# Patient Record
Sex: MALE | Employment: FULL TIME | URBAN - METROPOLITAN AREA
[De-identification: names, ages, dates, MRNs, and addresses within clinical notes are randomized per-mention and may not be internally consistent; named-entity substitution may affect disease eponyms.]

---

## 2016-08-07 LAB
CREATININE, EXTERNAL: 1.91
HBA1C MFR BLD HPLC: 6.3 %
LDL-C, EXTERNAL: 74
PSA, EXTERNAL: 0.5

## 2017-08-29 ENCOUNTER — OFFICE VISIT (OUTPATIENT)
Dept: HEMATOLOGY | Age: 57
End: 2017-08-29

## 2017-08-29 VITALS
WEIGHT: 179.8 LBS | DIASTOLIC BLOOD PRESSURE: 72 MMHG | OXYGEN SATURATION: 98 % | SYSTOLIC BLOOD PRESSURE: 156 MMHG | HEART RATE: 87 BPM | HEIGHT: 66 IN | TEMPERATURE: 98.8 F | BODY MASS INDEX: 28.9 KG/M2

## 2017-08-29 DIAGNOSIS — Z79.899 LONG TERM CURRENT USE OF IMMUNOSUPPRESSIVE DRUG: ICD-10-CM

## 2017-08-29 DIAGNOSIS — Z94.4 LIVER REPLACED BY TRANSPLANT (HCC): ICD-10-CM

## 2017-08-29 DIAGNOSIS — Z94.4 H/O LIVER TRANSPLANT (HCC): ICD-10-CM

## 2017-08-29 DIAGNOSIS — N28.9 RENAL INSUFFICIENCY: ICD-10-CM

## 2017-08-29 DIAGNOSIS — T86.40 COMPLICATION OF TRANSPLANTED LIVER, UNSPECIFIED COMPLICATION (HCC): Primary | ICD-10-CM

## 2017-10-08 NOTE — PROGRESS NOTES
[address] MD Guzman, [address], [place], [place]       Faustina Cuellar, JEFERSON Whelan, [facility]   EVETTE Li NP        at [address], [address], [facility].     [phone]     FAX: [phone]    at [facility]     [address], [address] [place] [address], [address]     [phone]     FAX: [phone]         PCP: Neto Hauser [place], [place]  GI: Monica García [place], [place]  Liver Transplant: [address], [facility]      Problem List  Date Reviewed: 10/16/2016          Codes Class Noted    Encounter for long-term (current) use of other medications ICD-10-CM: Z79.899  ICD-9-CM: V58.69  4/41/5356        Complications of transplanted liver ICD-10-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 No DVT. Clot source never defined. H/O liver transplant [facility]) ICD-10-CM: Z94.4  ICD-9-CM: V42.7  Unknown    Overview Addendum 8/18/2011  3:05 PM by Dannielle Goodman MD     [facility]  Bile duct leak surgically repaired 11/2010  Bile duct stent removed by ERCP 2/2011.                Guillain-McCarr syndrome - treated with IVg ICD-10-CM: G61.0  ICD-9-CM: 357.0  Unknown        Tremor - secondary to prograff ICD-10-CM: R25.1  ICD-9-CM: 781.0  Unknown    Overview Signed 11/28/2011  1:39 PM by Dannielle Goodman MD     Resolved when switched to Cyclosporin             Headache - secondary to immune suppression ICD-10-CM: R51  ICD-9-CM: 784.0  Unknown        Renal insufficiency - secondary to immune suppression ICD-10-CM: N28.9  ICD-9-CM: 593.9  Unknown        Edema ICD-10-CM: R60.9  ICD-9-CM: 879. 3  Unknown        Hypertension - secondary to immune suppressive medications ICD-10-CM: I10  ICD-9-CM: 401.9  8/18/2011                Lico Billings MD returns to the [facility] for his annual visit, management of liver allograft function and to manage side effects of immune suppression. The active problem list, all pertinent past medical history, medications, endoscopic studies, radiologic findings and laboratory findings related to the liver disorder were reviewed with the patient. We continue to monitor his labs and immune suppression between office visits. The patient underwent liver transplantation at [facility] in 11/2010. The patient was switched to sirolimus based immune suppression because of chronic renal insufficiency. He is also receiving cellcept. There has been no episodes of acute rejection. There has been no evidence of chronic rejection. The liver transaminases have been normal, alkaline phosphatase is normal, tests of hepatic synthetic and metabolic function are normal, and the platelet count is normal.    He takes lasix daily for lower extremity edema. The patient notes fatigue,     The fatigue limits his ability to work full time. He is able to work about 3 half days per week. Anything more is exhuasting and causes increased lower edema because he is on his feet too long. The patient has not experienced fevers, chills, problems concentrating, swelling of the abdomen, swelling of the lower extremities, hematemesis, hematochezia. The patient has moderate limitations in functional activities which can be attributed to the liver disease and to other medical problems that are not related to the liver disease. He can work part time but not full time because of the above symptoms. There ha ve been no new medical issues or symptoms in the past year.         Allergies   Allergen Reactions    Sulfa (Sulfonamide Antibiotics) Rash     Current Outpatient Prescriptions   Medication Sig    magnesium oxide (MAG-OX) 400 mg tablet Take 2 Tabs by mouth two (2) times a day.  Sirolimus (RAPAMUNE) 0.5 mg tablet Take 1 tablet by mouth two (2) times a day.  sitaGLIPtin-metFORMIN (JANUMET)  mg per tablet Take 1 Tab by mouth two (2) times daily (with meals).  potassium chloride (KLOR-CON) 20 mEq packet Take 20 mEq by mouth two (2) times a day.  docusate sodium (COLACE) 100 mg capsule Take 100 mg by mouth two (2) times a day.  rosuvastatin (CRESTOR) 10 mg tablet Take 10 mg by mouth nightly.  esomeprazole (NEXIUM) 40 mg capsule Take  by mouth daily.  venlafaxine-SR (EFFEXOR XR) 150 mg capsule Take  by mouth daily.  furosemide (LASIX) 20 mg tablet Take  by mouth daily.  nebivolol (BYSTOLIC) 10 mg tablet Take  by mouth daily.  finasteride (PROPECIA) 1 mg tablet Take 1 mg by mouth daily.  Cholecalciferol, Vitamin D3, 2,000 unit cap Take  by mouth.  tamsulosin (FLOMAX) 0.4 mg capsule Take 0.4 mg by mouth daily.  sitaGLIPtin (JANUVIA) 100 mg tablet Take 100 mg by mouth daily.  HYDROcodone-acetaminophen (NORCO)  mg tablet Take 1 Tab by mouth every eight (8) hours as needed for Pain.  vilazodone (VIIBRYD) 40 mg Tab Take 40 mg by mouth daily.  mycophenolate (CELLCEPT) 250 mg capsule Take 3 Caps by mouth two (2) times a day.  gabapentin (NEURONTIN) 800 mg tablet Take 900 mg by mouth three (3) times daily.  atenolol (TENORMIN) 50 mg tablet Take 1 Tab by mouth daily.  enalapril (VASOTEC) 10 mg tablet Take 1 Tab by mouth two (2) times a day. No current facility-administered medications for this visit. SYSTEM REVIEW NOT RELATED TO LIVER DISEASE OR REVIEWED ABOVE:  Constitution systems: Negative for fever, chills, weight gain, weight loss. Eyes: Negative for visual changes. ENT: Negative for sore throat, painful swallowing.    Respiratory: Negative for cough, hemoptysis, SOB.   Cardiology: Negative for chest pain, palpitations. GI:  Negative for constipation or diarrhea. : Negative for urinary frequency, dysuria, hematuria, nocturia. Skin: Negative for rash. Hematology: Negative for easy bruising, blood clots. Musculo-skelatal: Knee pain. Neurologic: Negative for headaches, dizziness, vertigo, memory problems not related to HE. Psychology: Negative for anxiety, depression. FAMILY HISTORY  There is no famaily history of liver disease. SOCIAL HISTORY:   with 2 children in college. He does not smoke tobacco.    He used to consume 2-3 alcoholic beverages per week. He stopped all alcohol in July 2010 when he became jaundiced and found out he had liver disease. 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 PHYSICAL EXAMINATION:  Visit Vitals    /72 (BP 1 Location: Left arm, BP Patient Position: Sitting)    Pulse 87    Temp 98.8 °F (37.1 °C) (Tympanic)    Ht 5' 6\" (1.676 m)    Wt 179 lb 12.8 oz (81.6 kg)    SpO2 98%    BMI 29.02 kg/m2     General: No acute distress. Eyes: Sclera anicteric. ENT: No oral lesions, thyroid normal.  Nodes: No adenopathy. Skin: No spider angiomata, jaundice, palmar erythema or xanthlasma. Respiratory: Lungs clear to auscultation. Cardiovascular: Regular heart rate, no murmurs, no JVD. Abdomen: Well healed incision. Soft non-tender, liver size normal to percussion/palpation. Spleen not palpable. No obvious ascites. Extremities: Trace edema confined to the feet, no muscle wasting, no gross arthritic changes. Neurologic: Alert and oriented, cranial nerves grossly intact, no asterixsis. No tremor.     LABORATORY STUDIES:  From 8/2016  AST/ALT/ALP/T Bili/ALB:  22/19/69/0.4/4.1  WBC/HB/PLT/INR:  7.7/12.5/361  BUN/CREAT:  24/1.91  CHOL 165  HBA1C 6.3%    From 5/2017  AST/ALT/ALP/T Bili/ALB:  27/25/68/0.3/4.2  WBC/HB/PLT/INR: 7. 1/14.1/308  BUN/CREAT:  22/1.6  CHOL: 214  PSA: 0.6    SEROLOGY:                                                                                                                           9/2010:  A1AT 222, TUYET negative, ANCA positive, ceruloplasmin 35, ferritin 360, AMA negative, ASMA negative, CEA 3.2,  HAV total positive, HBsAg negative, anti-HB core negative, anti-HB surface positive, anti-HCV negative, HbA1C 5.8. LIVER HISTOLOGY:  None since liver transplant    ENDOSCOPIC PROCEDURES:  9/2010:  Colonoscopy - several adenomatous polyps. RADIOLOGY:  11/2015. CT scan chest and abdomen. Normal appearing liver. Mild dilation of common bile duct. Thickening of gastric folds. Groundglass densities in lungs. 9/2016. CT scan chest and abdomen. Normal appearing liver. Normal lungs. Coronary calcification. OTHER TESTING:  None since liver transplant    ASSESSMENT AND PLAN:  Liver transplant with normal graft function. The liver transaminsses are normal.  Alkaline phosphate is normal.  Liver synthetic and metabolic function are normal.  The platelet count is normal.    The patient is receiving immune suppression with sirolimus as single agent therapy. This is being tolerated with some side effects. Chronic renal insufficiency is stable. Lower edema is now minimal.  He is taking lasix daily. Hypercholesterolemia can be caused by immune suppression. Serum cholesterol is normal on a statin. There is coronary calcification on a CT scan. He might want to consider undoing a stress test.      Hypertension can be caused by immune suppression. Blood pressure is well controlled on current treatment. There was thick gastric folds on a recent CT scan. I would recommened that he have EGD with biopsy of stomach to assess this. The risk of lymphoma is increased in liver transplant recipients and this can occur in the stomach. The patient was counseled regarding alcohol use.     Low serum magnesium can be caused by immune suppression. The patient appears to be tolerating the current dose of oral magnesium well without side effects. Laboratory studies should be performed every 4 months to assess liver graft function and blood levels of immune suppression. [address] [address] 12 months.     Nery Ruiz MD  [facility] [place] [facility] [address] [place] [address], [address] [place] [address] [facility], [facility] [address]  [phone]

## 2018-10-05 ENCOUNTER — OFFICE VISIT (OUTPATIENT)
Dept: HEMATOLOGY | Age: 58
End: 2018-10-05

## 2018-10-05 VITALS
WEIGHT: 178 LBS | TEMPERATURE: 97.8 F | OXYGEN SATURATION: 100 % | BODY MASS INDEX: 28.61 KG/M2 | DIASTOLIC BLOOD PRESSURE: 70 MMHG | HEIGHT: 66 IN | SYSTOLIC BLOOD PRESSURE: 145 MMHG | HEART RATE: 61 BPM

## 2018-10-05 DIAGNOSIS — Z94.4 H/O LIVER TRANSPLANT (HCC): ICD-10-CM

## 2018-10-05 DIAGNOSIS — T86.40 COMPLICATION OF TRANSPLANTED LIVER, UNSPECIFIED COMPLICATION (HCC): Primary | ICD-10-CM

## 2018-10-05 NOTE — PROGRESS NOTES
Chief Complaint   Patient presents with    Follow-up     Visit Vitals    /70 (BP 1 Location: Left arm, BP Patient Position: Sitting)    Pulse 61    Temp 97.8 °F (36.6 °C) (Tympanic)    Ht 5' 6\" (1.676 m)    Wt 178 lb (80.7 kg)    SpO2 100%    BMI 28.73 kg/m2     PHQ over the last two weeks 10/7/2016   Little interest or pleasure in doing things Not at all   Feeling down, depressed, irritable, or hopeless Not at all   Total Score PHQ 2 0

## 2018-10-05 NOTE — MR AVS SNAPSHOT
[address] [facility] ..[phone] [place] [address] 
[phone] Patient: Og Sofia MD 
MRN: [id number] :1960 Visit Information Date & Time Provider Department Dept. Phone Encounter #  
 10/5/2018  3:30 PM Astrid Joyner. [address] [place] [address] [id number] Follow-up Instructions Return in about 6 months (around 2019) for MLS. Your Appointments 2019  1:00 PM  
Follow Up with MD Theresa Joyner [address]) Appt Note: Follow up [facility] .[phone] [facility] [address]  
[address] [facility] [address] Upcoming Health Maintenance Date Due Hepatitis C Screening 1960 DTaP/Tdap/Td series (1 - Tdap) 8/10/1981 Shingrix Vaccine Age 50> (1 of 2) 8/10/2010 FOBT Q 1 YEAR AGE 50-75 8/10/2010 Influenza Age 5 to Adult 2018 Allergies as of 10/5/2018  Review Complete On: 10/5/2018 By: Jan May LPN Severity Noted Reaction Type Reactions Sulfa (Sulfonamide Antibiotics) Low 2011    Rash Current Immunizations  Never Reviewed No immunizations on file. Not reviewed this visit Vitals BP Pulse Temp Height(growth percentile) 145/70 (BP 1 Location: Left arm, BP Patient Position: Sitting) 61 97.8 °F (36.6 °C) (Tympanic) 5' 6\" (1.676 m) Weight(growth percentile) SpO2 BMI Smoking Status 178 lb (80.7 kg) 100% 28.73 kg/m2 Never Smoker Vitals History BMI and BSA Data Body Mass Index Body Surface Area 28.73 kg/m 2 1.94 m 2 Preferred Pharmacy Pharmacy Name Phone [id number] [address]  [phone] Your Updated Medication List  
  
   
This list is accurate as of 10/5/18  4:51 PM.  Always use your most recent med list.  
  
  
  
  
 atenolol 50 mg tablet Commonly known as:  TENORMIN  
 Take 1 Tab by mouth daily. BYSTOLIC 10 mg tablet Generic drug:  nebivolol Take  by mouth daily. Cholecalciferol (Vitamin D3) 2,000 unit Cap capsule Commonly known as:  VITAMIN D3 Take  by mouth. COLACE 100 mg capsule Generic drug:  docusate sodium Take 100 mg by mouth two (2) times a day. CRESTOR 10 mg tablet Generic drug:  rosuvastatin Take 10 mg by mouth nightly. EFFEXOR  mg capsule Generic drug:  venlafaxine-SR Take  by mouth daily. enalapril 10 mg tablet Commonly known as:  Mara Stacks Take 1 Tab by mouth two (2) times a day. FLOMAX 0.4 mg capsule Generic drug:  tamsulosin Take 0.4 mg by mouth daily. HYDROcodone-acetaminophen  mg tablet Commonly known as:  Nory Dheeraj Take 1 Tab by mouth every eight (8) hours as needed for Pain. JANUMET  mg per tablet Generic drug:  SITagliptin-metFORMIN Take 1 Tab by mouth two (2) times daily (with meals). JANUVIA 100 mg tablet Generic drug:  SITagliptin Take 100 mg by mouth daily. KLOR-CON 20 mEq packet Generic drug:  potassium chloride Take 20 mEq by mouth two (2) times a day. LASIX 20 mg tablet Generic drug:  furosemide Take  by mouth daily. magnesium oxide 400 mg tablet Commonly known as:  MAG-OX Take 2 Tabs by mouth two (2) times a day. mycophenolate mofetil 250 mg capsule Commonly known as:  CELLCEPT Take 3 Caps by mouth two (2) times a day. NEURONTIN 800 mg tablet Generic drug:  gabapentin Take 900 mg by mouth three (3) times daily. NexIUM 40 mg capsule Generic drug:  esomeprazole Take  by mouth daily. PROPECIA 1 mg tablet Generic drug:  finasteride Take 1 mg by mouth daily. Sirolimus 0.5 mg tablet Commonly known as:  RAPAMUNE Take 1 tablet by mouth two (2) times a day. vilazodone 40 mg Tab tablet Commonly known as:  VIIBRYD Take 40 mg by mouth daily. Follow-up Instructions Return in about 6 months (around 4/5/2019) for MLS. Introducing [facility] SERVICES! Dear Fernando Cardenas: 
Thank you for requesting a [employer] account. Our records indicate that you already have an active [employer] account. You can access your account anytime at [email or web address] Did you know that you can access your hospital and ER discharge instructions at any time in [employer]? You can also review all of your test results from your hospital stay or ER visit. Additional Information If you have questions, please visit the Frequently Asked Questions section of the [employer] website at [email or web address] Remember, [employer] is NOT to be used for urgent needs. For medical emergencies, dial 911. Now available from your iPhone and Android! Please provide this summary of care documentation to your next provider. If you have any questions after today's visit, please call [phone].

## 2018-10-05 NOTE — PROGRESS NOTES
[address] [facility] [address], [place], [address] [address], [place], [place]       Jaimie Melgar, EVETTE Perkins, JEFERSON Katz, [facility]   Will Cruzito, EVETTE Buckner, EVETTE Beard [address]    at [facility]    [address], [address] [address]    [phone]    FAX: [address] [place] [address] Avenue    at [address], [address], [address]    [phone]    FAX: [phone]       PCP: OLIVIA Mcfarland [place]  GI: Scarlett Calhoun [place]  Liver Transplant: [facility]      Problem List  Date Reviewed: 10/8/2017          Codes Class Noted    Complication of transplanted liver [facility]) ICD-10-CM: T86.40  ICD-9-CM: 996.82  8/30/2015        S/P knee replacement ICD-10-CM: Z96.659  ICD-9-CM: V43.65  7/8/2013        P[facility] ([facility] ICD-10-CM: I26.99  ICD-9-CM: 415.19  10/5/2012    Overview Signed 10/5/2012  1:28 PM by Nguyen Bear MD     8/2012. No DVT. Clot source never defined. H/O liver transplant [facility]) ICD-10-CM: Z94.4  ICD-9-CM: V42.7  Unknown    Overview Addendum 8/18/2011  3:05 PM by Nguyen Bear, [address] [place] [address]  Bile duct leak surgically repaired 11/2010  Bile duct stent removed by ERCP 2/2011.                Guillain-Sulphur syndrome - treated with IVg ICD-10-CM: G61.0  ICD-9-CM: 357.0  Unknown        Tremor - secondary to prograff ICD-10-CM: R25.1  ICD-9-CM: 781.0  Unknown    Overview Signed 11/28/2011  1:39 PM by Nguyen Bear MD     Resolved when switched to Cyclosporin             Headache - secondary to immune suppression ICD-10-CM: R51  ICD-9-CM: 784.0  Unknown        Renal insufficiency - secondary to immune suppression ICD-10-CM: N28.9  ICD-9-CM: 593.9  Unknown        Edema ICD-10-CM: R60.9  ICD-9-CM: 797. 3  Unknown        Hypertension - secondary to immune suppressive medications ICD-10-CM: I10  ICD-9-CM: 401.9  8/18/2011                Alise Nina MD [facility] for his annual visit, management of liver allograft function and to manage side effects of immune suppression. The active problem list, all pertinent past medical history, medications, endoscopic studies, radiologic findings and laboratory findings related to the liver disorder were reviewed with the patient. We continue to monitor his labs and immune suppression between office visits. The patient underwent liver transplantation at [facility] in 11/2010. The patient was switched to sirolimus based immune suppression because of chronic renal insufficiency. He is also receiving cellcept. There has been no episodes of acute rejection. There has been no evidence of chronic rejection. The patient notes fatigue,     The fatigue limits his ability to work full time. He is able to work about 3 half days per week. Anything more is exhuasting and causes increased lower edema because he is on his feet too long. The patient has not experienced fevers, chills, problems concentrating, swelling of the abdomen, swelling of the lower extremities, hematemesis, hematochezia. The patient has moderate limitations in functional activities which can be attributed to the liver disease and to other medical problems that are not related to the liver disease. He can work part time but not full time because of the above symptoms. There have been no new medical issues or symptoms in the past year. ASSESSMENT AND PLAN:  Liver transplant   This was for cirrhosis secondary to Unknown etiology.   Liver transaminases are normal.  ALP is normal.  Liver function is normal.  The platelet count is normal.      Immune Suppression  The patient is receiving immune suppression with sirolimus which is being well tolerated   The immune suppression blood level is not available. Will continue primary immune suppression at the current dose. Cellcept is being tolerated well   Will continue at current dose     Acute and chronic kidney injury   This is a common adverse event of immune suppression. The Screat is elevated at 1.63 mg. This is most likely secondary to hypertension and NSAIDS he took in the past for knee arthritis. Aspirin and NSAIDs should be avoided since these agents can worsen renal insufficiency. Lower extremity edema  This has resolved with current dose of lasix 20 mg prn    Hypercholesterolemia   This can be caused by immune suppression. Serum cholesterol is normal on a statin. There is coronary calcification on a CT scan. He might want to consider undoing a stress test.      Hypertension   This can be caused by immune suppression. Blood pressure is well controlled on current treatment. Thick gastric folds  There was thick gastric folds on a recent CT scan. I would recommened that he have EGD with biopsy of stomach to assess this. The risk of lymphoma is increased in liver transplant recipients and this can occur in the stomach. Use of medications in patients with a liver transplant  There are no contraindications for the patient to take any medications that are necessary for treatment of other medical issues. Many medications may interact with immune suppression medications and this should be checked prior to starting a new medication. The immune does of immune supression medications may need to be adjusted if started on a new medication. The patient has alcohol induced liver disease but has been abstinent from alcohol for greater than 6 months. The patient does not consume alcohol daily. Normal doses of acetaminophen can be used for pain as needed.   Normal doses of acetaminophen as recommended on the label are not hepatotoxic, even in patients with cirrhosis. Low serum magnesium   This is a common side effect of immune suppression. The patient has a normal or near normal magnesium level and does not need supplementation. Osteoporosis   Osteoporosis is commin in patients with cirhrosis prior to liver transplant. DEXA bone density to assess for osteoporosis should be performed at some point by the PCP. Counseling for alcohol in patients with chronic liver disease  The patient was counseled regarding alcohol consumption and the effect of alcohol on chronic liver disease. The patient does not consume any significant amount of alcohol. Monitoring for skin Cancer  The patient was counseled regarding increased risk of skin cancer in transplant recipients and need to have any new skin lesions evaluated by dermatology and removed if suspicious. The patient was instructed to see Dermatology annually examination. Vaccinations  Routine vaccinations against other bacterial and viral agents can be performed as long as this is with attentuatted virus. Live virus vaccines should not be administered. Annual flu vaccination should be administered. Allergies   Allergen Reactions    Sulfa (Sulfonamide Antibiotics) Rash     Current Outpatient Medications   Medication Sig    magnesium oxide (MAG-OX) 400 mg tablet Take 2 Tabs by mouth two (2) times a day.  Sirolimus (RAPAMUNE) 0.5 mg tablet Take 1 tablet by mouth two (2) times a day.  potassium chloride (KLOR-CON) 20 mEq packet Take 20 mEq by mouth two (2) times a day.  docusate sodium (COLACE) 100 mg capsule Take 100 mg by mouth two (2) times a day.  esomeprazole (NEXIUM) 40 mg capsule Take  by mouth daily.  venlafaxine-SR (EFFEXOR XR) 150 mg capsule Take  by mouth daily.  furosemide (LASIX) 20 mg tablet Take  by mouth daily.  finasteride (PROPECIA) 1 mg tablet Take 1 mg by mouth daily.     Cholecalciferol, Vitamin D3, 2,000 unit cap Take by mouth.  tamsulosin (FLOMAX) 0.4 mg capsule Take 0.4 mg by mouth daily.  sitaGLIPtin (JANUVIA) 100 mg tablet Take 100 mg by mouth daily.  HYDROcodone-acetaminophen (NORCO)  mg tablet Take 1 Tab by mouth every eight (8) hours as needed for Pain.  atenolol (TENORMIN) 50 mg tablet Take 1 Tab by mouth daily.  enalapril (VASOTEC) 10 mg tablet Take 1 Tab by mouth two (2) times a day.  sitaGLIPtin-metFORMIN (JANUMET)  mg per tablet Take 1 Tab by mouth two (2) times daily (with meals).  rosuvastatin (CRESTOR) 10 mg tablet Take 10 mg by mouth nightly.  nebivolol (BYSTOLIC) 10 mg tablet Take  by mouth daily.  vilazodone (VIIBRYD) 40 mg Tab Take 40 mg by mouth daily.  mycophenolate (CELLCEPT) 250 mg capsule Take 3 Caps by mouth two (2) times a day.  gabapentin (NEURONTIN) 800 mg tablet Take 900 mg by mouth three (3) times daily. No current facility-administered medications for this visit. SYSTEM REVIEW NOT RELATED TO LIVER DISEASE OR REVIEWED ABOVE:  Constitution systems: Negative for fever, chills, weight gain, weight loss. Eyes: Negative for visual changes. ENT: Negative for sore throat, painful swallowing. Respiratory: Negative for cough, hemoptysis, SOB. Cardiology: Negative for chest pain, palpitations. GI:  Negative for constipation or diarrhea. : Negative for urinary frequency, dysuria, hematuria, nocturia. Skin: Negative for rash. Hematology: Negative for easy bruising, blood clots. Musculo-skelatal: Knee pain. Neurologic: Negative for headaches, dizziness, vertigo, memory problems not related to HE. Psychology: Negative for anxiety, depression. FAMILY HISTORY  There is no famaily history of liver disease. SOCIAL HISTORY:   with 2 children in college. He does not smoke tobacco.    He used to consume 2-3 alcoholic beverages per week.     He stopped all alcohol in July 2010 when he became jaundiced and found out he had liver disease. 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 PHYSICAL EXAMINATION:  Visit Vitals  /70 (BP 1 Location: Left arm, BP Patient Position: Sitting)   Pulse 61   Temp 97.8 °F (36.6 °C) (Tympanic)   Ht 5' 6\" (1.676 m)   Wt 178 lb (80.7 kg)   SpO2 100%   BMI 28.73 kg/m²     General: No acute distress. Eyes: Sclera anicteric. ENT: No oral lesions, thyroid normal.  Nodes: No adenopathy. Skin: No spider angiomata, jaundice, palmar erythema or xanthlasma. Respiratory: Lungs clear to auscultation. Cardiovascular: Regular heart rate, no murmurs, no JVD. Abdomen: Well healed incision. Soft non-tender, liver size normal to percussion/palpation. Spleen not palpable. No obvious ascites. Extremities: Trace edema confined to the feet, no muscle wasting, no gross arthritic changes. Neurologic: Alert and oriented, cranial nerves grossly intact, no asterixsis. No tremor. LABORATORY STUDIES:  From 8/2016  AST/ALT/ALP/T Bili/ALB:  22/19/69/0.4/4.1  WBC/HB/PLT/INR:  7.7/12.5/361  BUN/CREAT:  24/1.91  CHOL 165  HBA1C 6.3%    From 5/2017  AST/ALT/ALP/T Bili/ALB:  27/25/68/0.3/4.2  WBC/HB/PLT/INR:  7.1/14.1/308  BUN/CREAT:  22/1.6  CHOL: 214  PSA: 0.6    From 10/2018  AST/ALT/ALP/T Bili/ALB:  21/19/76/0.4/  WBC/HB/PLT/INR:  6.9/13.2/360  BUN/CREAT:  30/1.63  CHOL:  191  PSA:  0.5    SEROLOGY:                                                                                                                           9/2010:  A1AT 222, TUYET negative, ANCA positive, ceruloplasmin 35, ferritin 360, AMA negative, ASMA negative, CEA 3.2,  HAV total positive, HBsAg negative, anti-HB core negative, anti-HB surface positive, anti-HCV negative, HbA1C 5.8. LIVER HISTOLOGY:  None since liver transplant    ENDOSCOPIC PROCEDURES:  9/2010:  Colonoscopy - several adenomatous polyps. RADIOLOGY:  11/2015. CT scan chest and abdomen. Normal appearing liver. Mild dilation of common bile duct. Thickening of gastric folds. Groundglass densities in lungs. 9/2016. CT scan chest and abdomen. Normal appearing liver. Normal lungs. Coronary calcification. OTHER TESTING:  None since liver transplant    FOLLOW-UP:  Laboratory studies should be performed every 3 months to assess liver graft function and blood levels of immune suppression. [address] [facility] 12 months.     Heidi Mejia MD  [facility] [address].  [phone]  [address] [place] [address]

## 2018-10-11 ENCOUNTER — TELEPHONE (OUTPATIENT)
Dept: HEMATOLOGY | Age: 58
End: 2018-10-11

## 2018-10-11 NOTE — TELEPHONE ENCOUNTER
Received voice mail from Maddox Arya with [facility], requesting last note from Dr. Silva Barkley. Left voice mail for patient to return call for approval to fax last note.      [address]  Fax:  [phone]    Phone: [phone]

## 2018-10-20 PROBLEM — Z79.60 LONG-TERM USE OF IMMUNOSUPPRESSANT MEDICATION: Status: ACTIVE | Noted: 2018-10-20

## 2021-03-05 ENCOUNTER — OFFICE VISIT (OUTPATIENT)
Dept: HEMATOLOGY | Age: 61
End: 2021-03-05
Payer: COMMERCIAL

## 2021-03-05 VITALS
HEART RATE: 80 BPM | SYSTOLIC BLOOD PRESSURE: 159 MMHG | OXYGEN SATURATION: 100 % | TEMPERATURE: 97.5 F | DIASTOLIC BLOOD PRESSURE: 84 MMHG | BODY MASS INDEX: 27.16 KG/M2 | WEIGHT: 169 LBS | RESPIRATION RATE: 16 BRPM | HEIGHT: 66 IN

## 2021-03-05 DIAGNOSIS — Z94.4 H/O LIVER TRANSPLANT (HCC): Primary | ICD-10-CM

## 2021-03-05 PROCEDURE — 99214 OFFICE O/P EST MOD 30 MIN: CPT | Performed by: INTERNAL MEDICINE

## 2021-03-05 NOTE — PROGRESS NOTES
Identified pt with two pt identifiers(name and ). Reviewed record in preparation for visit and have obtained necessary documentation. Chief Complaint   Patient presents with    Other     Transplant f/u      Vitals:    21 1409 21 1418   BP: (!) 162/83 (!) 159/84   Pulse: 80    Resp: 16    Temp: 97.5 °F (36.4 °C)    TempSrc: Temporal    SpO2: 100%    Weight: 169 lb (76.7 kg)    Height: 5' 6\" (1.676 m)    PainSc:   2    PainLoc: Abdomen        Health Maintenance Review: Patient reminded of \"due or due soon\" health maintenance. I have asked the patient to contact his/her primary care provider (PCP) for follow-up on his/her health maintenance. Coordination of Care Questionnaire:  :   1) Have you been to an emergency room, urgent care, or hospitalized since your last visit? If yes, where when, and reason for visit? no       2. Have seen or consulted any other health care provider since your last visit? If yes, where when, and reason for visit? NO      Patient is accompanied by  I have received verbal consent from Mehdi Jay MD to discuss any/all medical information while they are present in the room.

## 2021-03-05 NOTE — PROGRESS NOTES
Johnie Roberto MD, Anh Chamberlain MD, MPH      Ortiz Zuleta, PA-C    Lorenzo Lilly, [facility]-BC     April S Juliana, [facility]   Michelle Olmedo [facility]-C    Luwanna Closs, [facility]       Yecenia Beard [facility] [address]    at [address] [place], [address] Shoshana Chairez  22.    [phone]    FAX: [address] [place] [address], [address]    [phone]    FAX: [phone]       PCP: OLIVIA Huff [facility]  GI: Scarlett Slade [place]  Liver Transplant: [facility]      Problem List  Date Reviewed: 10/20/2018          Codes Class Noted    Long-term use of immunosuppressant medication ICD-10-CM: Z79.899  ICD-9-CM: V58.69  73/68/8644        Complication of transplanted liver [facility]) ICD-10-CM: T86.40  ICD-9-CM: 996.82  8/30/2015        S/P knee replacement ICD-10-CM: Z96.659  ICD-9-CM: V43.65  7/8/2013        [facility] ([facility] ICD-10-CM: I26.99  ICD-9-CM: 415.19  10/5/2012    Overview Signed 10/5/2012  1:28 PM by Addy Corbin MD     8/2012. No DVT. Clot source never defined. H/O liver transplant [facility]) ICD-10-CM: Z94.4  ICD-9-CM: V42.7  Unknown    Overview Addendum 8/18/2011  3:05 PM by Addy Corbin [address] [place] [address]  Bile duct leak surgically repaired 11/2010  Bile duct stent removed by ERCP 2/2011.                Guillain-Medford syndrome - treated with IVg ICD-10-CM: G61.0  ICD-9-CM: 357.0  Unknown        Tremor - secondary to prograff ICD-10-CM: R25.1  ICD-9-CM: 781.0  Unknown    Overview Signed 11/28/2011  1:39 PM by Addy Corbin MD     Resolved when switched to Cyclosporin             Headache - secondary to immune suppression ICD-10-CM: R51  ICD-9-CM: 784.0  Unknown        Renal insufficiency - secondary to immune suppression ICD-10-CM: N28.9  ICD-9-CM: 593.9  Unknown        Edema ICD-10-CM: R60.9  ICD-9-CM: 782. 3  Unknown        Hypertension - secondary to immune suppressive medications ICD-10-CM: I10  ICD-9-CM: 401.9  8/18/2011                Mehdi Jay [facility], management of liver allograft function and to manage side effects of immune suppression. The active problem list, all pertinent past medical history, medications, endoscopic studies, radiologic findings and laboratory findings related to the liver disorder were reviewed with the patient. We continue to monitor his labs and immune suppression between office visits. The patient underwent liver transplantation at [facility] in 11/2010. The patient was switched to sirolimus based immune suppression because of chronic renal insufficiency. There has been no episodes of acute rejection. There has been no evidence of chronic rejection. He tested positive for SARS-COV-2 in 12/2020. He was asymtomatic and simply quarantined for 2 weeks. The patient notes fatigue,     The patient has the following symptoms which are thought to be due to the liver disease:    Fatigue which limits him from working full time. He has lost about 20 pounds feels better and looks better. He has more energy to do activities but still gets too tired to focus when seeing too many patients a day. The patient is not currently experiencing the following symptoms of liver disease:  Fever, chills, swelling of the abdomen, swelling of the lower extremities, hematemesis,   hematochezia. The patient has Mild limitations in functional activities which can be attributed to the liver disease and   to other medical problems that are not related to the liver disease.       ASSESSMENT AND PLAN:  Liver transplant   This was for cirrhosis secondary to Unknown etiology. Liver transaminases are normal.  ALP is normal.  Liver function is normal.  The platelet count is normal.      Immune Suppression  The patient is receiving immune suppression with sirolimus which is being well tolerated   The immune suppression blood level are in the correct range. Will continue primary immune suppression at the current dose. Cellcept was stopped few years ago    Acute and chronic kidney injury   This is a common adverse event of immune suppression. The Screat is down to 1.50 mg    This is most likely secondary to hypertension and NSAIDS he took in the past for knee arthritis. Aspirin and NSAIDs should be avoided since these agents can worsen renal insufficiency. Lower extremity edema  This has resolved with current dose of lasix 20 mg prn    Hypercholesterolemia   This can be caused by immune suppression. Serum cholesterol is normal on a statin. There is coronary calcification on a CT scan. He might want to consider undoing a stress test.      Hypertension   This can be caused by immune suppression. Blood pressure is well controlled on current treatment. Thick gastric folds  There was thick gastric folds on a recent CT scan. I would recommened that he have EGD with biopsy of stomach to assess this. The risk of lymphoma is increased in liver transplant recipients and this can occur in the stomach. Use of medications in patients with a liver transplant  There are no contraindications for the patient to take any medications that are necessary for treatment of other medical issues. Many medications may interact with immune suppression medications and this should be checked prior to starting a new medication. The immune does of immune supression medications may need to be adjusted if started on a new medication.     The patient has alcohol induced liver disease but has been abstinent from alcohol for greater than 6 months. The patient does not consume alcohol daily. Normal doses of acetaminophen can be used for pain as needed. Normal doses of acetaminophen as recommended on the label are not hepatotoxic, even in patients with cirrhosis. Low serum magnesium   This is a common side effect of immune suppression. The patient has a normal or near normal magnesium level and does not need supplementation. Osteoporosis   Osteoporosis is commin in patients with cirhrosis prior to liver transplant. DEXA bone density to assess for osteoporosis should be performed at some point by the PCP. Counseling for alcohol in patients with chronic liver disease  The patient was counseled regarding alcohol consumption and the effect of alcohol on chronic liver disease. The patient does not consume any significant amount of alcohol. Monitoring for skin Cancer  The patient was counseled regarding increased risk of skin cancer in transplant recipients and need to have any new skin lesions evaluated by dermatology and removed if suspicious. The patient was instructed to see Dermatology annually examination. Vaccinations  Routine vaccinations against other bacterial and viral agents can be performed as long as this is with attentuatted virus. Live virus vaccines should not be administered. Annual flu vaccination should be administered. Allergies   Allergen Reactions    Sulfa (Sulfonamide Antibiotics) Rash     Current Outpatient Medications   Medication Sig    magnesium oxide (MAG-OX) 400 mg tablet Take 2 Tabs by mouth two (2) times a day.  Sirolimus (RAPAMUNE) 0.5 mg tablet Take 1 tablet by mouth two (2) times a day.  sitaGLIPtin-metFORMIN (JANUMET)  mg per tablet Take 1 Tab by mouth two (2) times daily (with meals).  potassium chloride (KLOR-CON) 20 mEq packet Take 20 mEq by mouth two (2) times a day.  docusate sodium (COLACE) 100 mg capsule Take 100 mg by mouth two (2) times a day.     rosuvastatin (CRESTOR) 10 mg tablet Take 10 mg by mouth nightly.  esomeprazole (NEXIUM) 40 mg capsule Take  by mouth daily.  venlafaxine-SR (EFFEXOR XR) 150 mg capsule Take  by mouth daily.  furosemide (LASIX) 20 mg tablet Take  by mouth daily.  nebivolol (BYSTOLIC) 10 mg tablet Take  by mouth daily.  finasteride (PROPECIA) 1 mg tablet Take 1 mg by mouth daily.  Cholecalciferol, Vitamin D3, 2,000 unit cap Take  by mouth.  tamsulosin (FLOMAX) 0.4 mg capsule Take 0.4 mg by mouth daily.  sitaGLIPtin (JANUVIA) 100 mg tablet Take 100 mg by mouth daily.  HYDROcodone-acetaminophen (NORCO)  mg tablet Take 1 Tab by mouth every eight (8) hours as needed for Pain.  vilazodone (VIIBRYD) 40 mg Tab Take 40 mg by mouth daily.  mycophenolate (CELLCEPT) 250 mg capsule Take 3 Caps by mouth two (2) times a day.  gabapentin (NEURONTIN) 800 mg tablet Take 900 mg by mouth three (3) times daily.  atenolol (TENORMIN) 50 mg tablet Take 1 Tab by mouth daily.  enalapril (VASOTEC) 10 mg tablet Take 1 Tab by mouth two (2) times a day. No current facility-administered medications for this visit. SYSTEM REVIEW NOT RELATED TO LIVER DISEASE OR REVIEWED ABOVE:  Constitution systems: Negative for fever, chills, weight gain, weight loss. Eyes: Negative for visual changes. ENT: Negative for sore throat, painful swallowing. Respiratory: Negative for cough, hemoptysis, SOB. Cardiology: Negative for chest pain, palpitations. GI:  Negative for constipation or diarrhea. : Negative for urinary frequency, dysuria, hematuria, nocturia. Skin: Negative for rash. Hematology: Negative for easy bruising, blood clots. Musculo-skelatal: Knee pain. Neurologic: Negative for headaches, dizziness, vertigo, memory problems not related to HE. Psychology: Negative for anxiety, depression. FAMILY HISTORY  There is no famaily history of liver disease.     SOCIAL HISTORY:   with 2 children in college. He does not smoke tobacco.    He used to consume 2-3 alcoholic beverages per week. He stopped all alcohol in July 2010 when he became jaundiced and found out he had liver disease. 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 PHYSICAL EXAMINATION:  Visit Vitals  BP (!) 159/84   Pulse 80   Temp 97.5 °F (36.4 °C) (Temporal)   Resp 16   Ht 5' 6\" (1.676 m)   Wt 169 lb (76.7 kg)   SpO2 100%   BMI 27.28 kg/m²     General: No acute distress. Eyes: Sclera anicteric. ENT: No oral lesions, thyroid normal.  Nodes: No adenopathy. Skin: No spider angiomata, jaundice, palmar erythema or xanthlasma. Respiratory: Lungs clear to auscultation. Cardiovascular: Regular heart rate, no murmurs, no JVD. Abdomen: Well healed incision. Soft non-tender, liver size normal to percussion/palpation. Spleen not palpable. No obvious ascites. Extremities: Trace edema confined to the feet, no muscle wasting, no gross arthritic changes. Neurologic: Alert and oriented, cranial nerves grossly intact, no asterixsis. No tremor. LABORATORY STUDIES:  From 3/2021  AST/ALT/ALP/T Bili/ALB:  35/40/118/0.3/4.0  WBC/HB/PLT/INR:  7.7/12.6/490  NA/BUN/CREAT:  20/1.50    SEROLOGY:                                                                                                                           9/2010:  A1AT 222, TUYET negative, ANCA positive, ceruloplasmin 35, ferritin 360, AMA negative, ASMA negative, CEA 3.2,  HAV total positive, HBsAg negative, anti-HB core negative, anti-HB surface positive, anti-HCV negative, HbA1C 5.8. LIVER HISTOLOGY:  None since liver transplant    ENDOSCOPIC PROCEDURES:  9/2010:  Colonoscopy  several adenomatous polyps. RADIOLOGY:  11/2015. CT scan chest and abdomen. Normal appearing liver. Mild dilation of common bile duct. Thickening of gastric folds. Groundglass densities in lungs. 9/2016.   CT scan chest and abdomen. Normal appearing liver. Normal lungs. Coronary calcification. OTHER TESTING:  None since liver transplant    FOLLOW-UP:  Laboratory studies should be performed every 3 months to assess liver graft function and blood levels of immune suppression. [address] [address] 12 months.     Tico Martinez MD  [facility] [address], [address] [place] [address] 22.  [phone]  [address] [place] [address]

## 2021-08-04 ENCOUNTER — TELEPHONE (OUTPATIENT)
Dept: HEMATOLOGY | Age: 61
End: 2021-08-04

## 2021-09-20 ENCOUNTER — TELEPHONE (OUTPATIENT)
Dept: HEMATOLOGY | Age: 61
End: 2021-09-20

## 2021-09-20 NOTE — TELEPHONE ENCOUNTER
Left voicemail for Dr. Sana Elizondo, checking in on status and updated lab results for immunosuppression levels. Need to also schedule a 1 year follow up with Dr. Shanthi Davis.

## 2021-10-29 DIAGNOSIS — Z94.4 H/O LIVER TRANSPLANT (HCC): Primary | ICD-10-CM

## 2022-01-18 ENCOUNTER — TELEPHONE (OUTPATIENT)
Dept: HEMATOLOGY | Age: 62
End: 2022-01-18

## 2022-01-18 NOTE — TELEPHONE ENCOUNTER
[email or web address] Attempt to return call for [address] [facility] [address] from Dr. Guevara Mercado. Message left for a letter of clearance for surgery. Our office needs to know type of surgery/fax number. Waiting call back. (F) [phone]. (KF)

## 2022-03-19 PROBLEM — Z79.60 LONG-TERM USE OF IMMUNOSUPPRESSANT MEDICATION: Status: ACTIVE | Noted: 2018-10-20

## 2022-06-10 ENCOUNTER — OFFICE VISIT (OUTPATIENT)
Dept: HEMATOLOGY | Age: 62
End: 2022-06-10
Payer: COMMERCIAL

## 2022-06-10 VITALS
OXYGEN SATURATION: 100 % | BODY MASS INDEX: 27.35 KG/M2 | HEIGHT: 66 IN | HEART RATE: 68 BPM | DIASTOLIC BLOOD PRESSURE: 73 MMHG | WEIGHT: 170.2 LBS | RESPIRATION RATE: 16 BRPM | SYSTOLIC BLOOD PRESSURE: 148 MMHG | TEMPERATURE: 97.1 F

## 2022-06-10 DIAGNOSIS — Z94.4 LIVER TRANSPLANT RECIPIENT (HCC): Primary | ICD-10-CM

## 2022-06-10 PROCEDURE — 99215 OFFICE O/P EST HI 40 MIN: CPT | Performed by: INTERNAL MEDICINE

## 2022-06-10 RX ORDER — CYCLOSPORINE 0 G/ML
SOLUTION/ DROPS OPHTHALMIC; TOPICAL AS NEEDED
COMMUNITY
Start: 2022-05-31

## 2022-06-10 RX ORDER — CYCLOBENZAPRINE HCL 10 MG
10 TABLET ORAL
COMMUNITY
Start: 2022-02-15

## 2022-06-10 RX ORDER — OXYCODONE HYDROCHLORIDE 15 MG/1
15 TABLET ORAL AS NEEDED
COMMUNITY
Start: 2022-05-11

## 2022-06-10 RX ORDER — ASPIRIN 81 MG/1
81 TABLET ORAL DAILY
COMMUNITY
Start: 2022-02-21

## 2022-06-10 RX ORDER — CETIRIZINE HCL 10 MG
10 TABLET ORAL
COMMUNITY

## 2022-06-10 NOTE — PROGRESS NOTES
Jagjit Phipps [address]      Lillian Jaimes MD, Ana Paul, Jessica Lozano MD, MPH      Dilip Schilling, PA-SHANIKA Allen, [facility]     Tiffany Andrews, [facility]   Carlos Mcdonough, P-C    Dc Bellamy, [facility]       Yecenia Beard [address]    at [address], [address] [place] [address], [facility] 22.    [phone]    FAX: [address] [place] [address]    at [address], [address]    [phone]    FAX: [phone]       PCP: Dilip Schilling [facility] [place]  GI: Leo Soto [place], [place]  Liver Transplant: [address], [facility]      Problem List  Date Reviewed: 6/26/2022          Codes Class Noted    H/O cervical spine surgery ICD-10-CM: Z98.890  ICD-9-CM: V45.89  6/26/2022        Type II diabetes mellitus ([facility] ICD-10-CM: E11.9  ICD-9-CM: 250.00  6/26/2022        Neuropathy ICD-10-CM: G62.9  ICD-9-CM: 355.9  6/26/2022        Long-term use of immunosuppressant medication ICD-10-CM: Z79.899  ICD-9-CM: V58.69  56/41/2068        Complication of transplanted liver ([facility] ICD-10-CM: T86.40  ICD-9-CM: 996.82  8/30/2015        S/P knee replacement ICD-10-CM: Z96.659  ICD-9-CM: V43.65  7/8/2013        Pulmonary embolism ([facility] ICD-10-CM: I26.99  ICD-9-CM: 415.19  10/5/2012    Overview Signed 10/5/2012  1:28 PM by Efrain Woods MD     8/2012. No DVT. Clot source never defined. H/O liver transplant [facility]) ICD-10-CM: Z94.4  ICD-9-CM: V42.7  Unknown    Overview Addendum 8/18/2011  3:05 PM by Efrain Woods, [address] [place] [address]  Bile duct leak surgically repaired 11/2010  Bile duct stent removed by ERCP 2/2011.                Guillain-Jones syndrome - treated with IVg ICD-10-CM: G61.0  ICD-9-CM: 357.0  Unknown        Tremor - secondary to prograff ICD-10-CM: R25.1  ICD-9-CM: 781.0  Unknown    Overview Signed 11/28/2011  1:39 PM by Efrain Woods MD     Resolved when switched to Cyclosporin             Headache - secondary to immune suppression ICD-10-CM: R51  ICD-9-CM: 784.0  Unknown        Renal insufficiency - secondary to immune suppression ICD-10-CM: N28.9  ICD-9-CM: 593.9  Unknown        Edema ICD-10-CM: R60.9  ICD-9-CM: 782. 3  Unknown        Hypertension - secondary to immune suppressive medications ICD-10-CM: I10  ICD-9-CM: 401.9  8/18/2011                Yue Beltran returns to the [facility] for his annual visit, management of liver allograft function and to manage side effects of immune suppression. The active problem list, all pertinent past medical history, medications, endoscopic studies, radiologic findings and laboratory findings related to the liver disorder were reviewed with the patient. We continue to monitor his labs and immune suppression between office visits. The patient underwent liver transplantation at [facility] in 11/2010. The patient was switched to sirolimus based immune suppression because of chronic renal insufficiency. There has been no episodes of acute rejection. There has been no evidence of chronic rejection. He tested positive for SARS-COV-2 in 12/2020. He was asymtomatic and simply quarantined for 2 weeks. The patient notes fatigue,     The patient has the following symptoms which are thought to be due to the liver disease:    Fatigue which limits him from working full time. He has lost about 20 pounds feels better and looks better. He has more energy to do activities but still gets too tired to focus when seeing too many patients a day. The patient is not currently experiencing the following symptoms of liver disease:  Fever, chills, swelling of the abdomen, swelling of the lower extremities, hematemesis,   hematochezia.     The patient has Mild limitations in functional activities which can be attributed to the liver disease and   to other medical problems that are not related to the liver disease. ASSESSMENT AND PLAN:  Liver transplant   This was for cirrhosis secondary to Unknown etiology. Liver transaminases are normal.  ALP is normal.  Liver function is normal.  The platelet count is normal.      Immune Suppression  The patient is receiving immune suppression with sirolimus which is being well tolerated   The immune suppression blood level are in the correct range. Will continue primary immune suppression at the current dose. Cellcept was stopped few years ago    Acute and chronic kidney injury   This is a common adverse event of immune suppression. The Screat is down to 1.50 mg    This is most likely secondary to hypertension and NSAIDS he took in the past for knee arthritis. Aspirin and NSAIDs should be avoided since these agents can worsen renal insufficiency. Lower extremity edema  This has resolved with current dose of lasix 20 mg prn    Hypercholesterolemia   This can be caused by immune suppression. Serum cholesterol is normal on a statin. There is coronary calcification on a CT scan. He might want to consider undoing a stress test.      Hypertension   This can be caused by immune suppression. Blood pressure is well controlled on current treatment. Thick gastric folds  There was thick gastric folds on a recent CT scan. I would recommened that he have EGD with biopsy of stomach to assess this. The risk of lymphoma is increased in liver transplant recipients and this can occur in the stomach. Use of medications in patients with a liver transplant  There are no contraindications for the patient to take any medications that are necessary for treatment of other medical issues.   Many medications may interact with immune suppression medications and this should be checked prior to starting a new medication. The immune does of immune supression medications may need to be adjusted if started on a new medication. The patient has alcohol induced liver disease but has been abstinent from alcohol for greater than 6 months. The patient does not consume alcohol daily. Normal doses of acetaminophen can be used for pain as needed. Normal doses of acetaminophen as recommended on the label are not hepatotoxic, even in patients with cirrhosis. Low serum magnesium   This is a common side effect of immune suppression. The patient has a normal or near normal magnesium level and does not need supplementation. Osteoporosis   Osteoporosis is commin in patients with cirhrosis prior to liver transplant. DEXA bone density to assess for osteoporosis should be performed at some point by the PCP. Counseling for alcohol in patients with chronic liver disease  The patient was counseled regarding alcohol consumption and the effect of alcohol on chronic liver disease. The patient does not consume any significant amount of alcohol. Monitoring for skin Cancer  The patient was counseled regarding increased risk of skin cancer in transplant recipients and need to have any new skin lesions evaluated by dermatology and removed if suspicious. The patient was instructed to see Dermatology annually examination. Vaccinations  Routine vaccinations against other bacterial and viral agents can be performed as long as this is with attentuatted virus. Live virus vaccines should not be administered. Annual flu vaccination should be administered.           Allergies   Allergen Reactions    Acetaminophen Other (comments)     S/P liver transplant    Nsaids (Non-Steroidal Anti-Inflammatory Drug) Other (comments)     Due to S/P Liver Transplant    Peanut Oil Swelling    Sulfa (Sulfonamide Antibiotics) Rash     Current Outpatient Medications   Medication Sig    aspirin delayed-release 81 mg tablet Take 81 mg by mouth daily.    cetirizine (ZYRTEC) 10 mg tablet Take 10 mg by mouth.  cyclobenzaprine (FLEXERIL) 10 mg tablet Take 10 mg by mouth three (3) times daily as needed.  Cequa 0.09 % dpet as needed.  oxyCODONE IR (OXY-IR) 15 mg immediate release tablet Take 15 mg by mouth as needed.  magnesium oxide (MAG-OX) 400 mg tablet Take 2 Tabs by mouth two (2) times a day.  Sirolimus (RAPAMUNE) 0.5 mg tablet Take 1 tablet by mouth two (2) times a day.  docusate sodium (COLACE) 100 mg capsule Take 100 mg by mouth two (2) times a day.  rosuvastatin (CRESTOR) 10 mg tablet Take 10 mg by mouth nightly.  esomeprazole (NEXIUM) 40 mg capsule Take  by mouth daily.  venlafaxine-SR (EFFEXOR XR) 150 mg capsule Take  by mouth daily.  furosemide (LASIX) 20 mg tablet Take  by mouth daily.  finasteride (PROPECIA) 1 mg tablet Take 1 mg by mouth daily.  Cholecalciferol, Vitamin D3, 2,000 unit cap Take  by mouth.  tamsulosin (FLOMAX) 0.4 mg capsule Take 0.4 mg by mouth daily.  sitaGLIPtin (JANUVIA) 100 mg tablet Take 100 mg by mouth daily.  atenolol (TENORMIN) 50 mg tablet Take 1 Tab by mouth daily.  enalapril (VASOTEC) 10 mg tablet Take 1 Tab by mouth two (2) times a day.  potassium chloride (KLOR-CON) 20 mEq packet Take 20 mEq by mouth as needed.  gabapentin (NEURONTIN) 800 mg tablet Take 300 mg by mouth nightly. No current facility-administered medications for this visit. SYSTEM REVIEW NOT RELATED TO LIVER DISEASE OR REVIEWED ABOVE:  Constitution systems: Negative for fever, chills, weight gain, weight loss. Eyes: Negative for visual changes. ENT: Negative for sore throat, painful swallowing. Respiratory: Negative for cough, hemoptysis, SOB. Cardiology: Negative for chest pain, palpitations. GI:  Negative for constipation or diarrhea. : Negative for urinary frequency, dysuria, hematuria, nocturia. Skin: Negative for rash. Hematology: Negative for easy bruising, blood clots. Musculo-skelatal: Knee pain. Neurologic: Negative for headaches, dizziness, vertigo, memory problems not related to HE. Psychology: Negative for anxiety, depression. FAMILY HISTORY  There is no famaily history of liver disease. SOCIAL HISTORY:   with 2 children in college. He does not smoke tobacco.    He used to consume 2-3 alcoholic beverages per week. He stopped all alcohol in July 2010 when he became jaundiced and found out he had liver disease. 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 PHYSICAL EXAMINATION:  Visit Vitals  BP (!) 148/73   Pulse 68   Temp 97.1 °F (36.2 °C) (Temporal)   Resp 16   Ht 5' 6\" (1.676 m)   Wt 170 lb 3.2 oz (77.2 kg)   SpO2 100%   BMI 27.47 kg/m²     General: No acute distress. Eyes: Sclera anicteric. ENT: No oral lesions, thyroid normal.  Nodes: No adenopathy. Skin: No spider angiomata, jaundice, palmar erythema or xanthlasma. Respiratory: Lungs clear to auscultation. Cardiovascular: Regular heart rate, no murmurs, no JVD. Abdomen: Well healed incision. Soft non-tender, liver size normal to percussion/palpation. Spleen not palpable. No obvious ascites. Extremities: Trace edema confined to the feet, no muscle wasting, no gross arthritic changes. Neurologic: Alert and oriented, cranial nerves grossly intact, no asterixsis. No tremor. LABORATORY STUDIES:  From 3/2021  AST/ALT/ALP/T Bili/ALB:  35/40/118/0.3/4.0  WBC/HB/PLT/INR:  7.7/12.6/490  NA/BUN/CREAT:  20/1.50    SEROLOGY:                                                                                                                           9/2010:  A1AT 222, TUYET negative, ANCA positive, ceruloplasmin 35, ferritin 360, AMA negative, ASMA negative, CEA 3.2,  HAV total positive, HBsAg negative, anti-HB core negative, anti-HB surface positive, anti-HCV negative, HbA1C 5.8.      LIVER HISTOLOGY:  None since liver transplant    ENDOSCOPIC PROCEDURES:  9/2010:  Colonoscopy - several adenomatous polyps. RADIOLOGY:  11/2015. CT scan chest and abdomen. Normal appearing liver. Mild dilation of common bile duct. Thickening of gastric folds. Groundglass densities in lungs. 9/2016. CT scan chest and abdomen. Normal appearing liver. Normal lungs. Coronary calcification. OTHER TESTING:  None since liver transplant    FOLLOW-UP:  Laboratory studies should be performed every 3 months to assess liver graft function and blood levels of immune suppression. [facility] 87 12 months.     MD Trev Nieto[address], [address] [place] [address].  [phone]  [address] [place] [address]

## 2022-06-10 NOTE — PROGRESS NOTES
Identified pt with two pt identifiers(name and ). Reviewed record in preparation for visit and have obtained necessary documentation. Chief Complaint   Patient presents with    Other     H/O liver transplant ([facility]   f/u      Vitals:    06/10/22 1321 06/10/22 1334   BP: (!) 155/79 (!) 148/73   Pulse: 68    Resp: 16    Temp: 97.1 °F (36.2 °C)    TempSrc: Temporal    SpO2: 100%    Weight: 170 lb 3.2 oz (77.2 kg)    Height: 5' 6\" (1.676 m)    PainSc:   6    PainLoc: Knee        Health Maintenance Review: Patient reminded of \"due or due soon\" health maintenance. I have asked the patient to contact his/her primary care provider (PCP) for follow-up on his/her health maintenance. Coordination of Care Questionnaire:  :   1) Have you been to an emergency room, urgent care, or hospitalized since your last visit? If yes, where when, and reason for visit? no       2. Have seen or consulted any other health care provider since your last visit? If yes, where when, and reason for visit? NO      Patient is accompanied by wife I have received verbal consent from Cl Vigil to discuss any/all medical information while they are present in the room.

## 2022-06-26 PROBLEM — Z98.890 H/O CERVICAL SPINE SURGERY: Status: ACTIVE | Noted: 2022-06-26

## 2022-06-26 PROBLEM — G62.9 NEUROPATHY: Status: ACTIVE | Noted: 2022-06-26

## 2022-06-26 PROBLEM — E11.9 TYPE II DIABETES MELLITUS (HCC): Status: ACTIVE | Noted: 2022-06-26

## 2022-08-28 LAB — HBA1C MFR BLD HPLC: 5.1 %

## 2023-03-05 LAB — HBA1C MFR BLD HPLC: 5.5 %

## 2023-05-11 LAB — HBA1C MFR BLD HPLC: 5.6 %

## 2023-10-16 LAB — HBA1C MFR BLD HPLC: 6.1 %

## 2023-11-05 LAB — HBA1C MFR BLD HPLC: 6.3 %

## 2024-03-01 LAB — HBA1C MFR BLD HPLC: 5.8 %

## 2024-04-09 DIAGNOSIS — Z94.4 LIVER TRANSPLANT STATUS (HCC): Primary | ICD-10-CM

## 2024-06-23 LAB — HBA1C MFR BLD HPLC: 6.4 %

## 2024-09-06 ENCOUNTER — TELEPHONE (OUTPATIENT)
Age: 64
End: 2024-09-06

## 2024-10-20 LAB — HBA1C MFR BLD HPLC: 6 %

## 2024-10-22 ENCOUNTER — OFFICE VISIT (OUTPATIENT)
Age: 64
End: 2024-10-22

## 2024-10-22 VITALS
HEIGHT: 67 IN | SYSTOLIC BLOOD PRESSURE: 156 MMHG | OXYGEN SATURATION: 97 % | BODY MASS INDEX: 28.25 KG/M2 | TEMPERATURE: 100 F | DIASTOLIC BLOOD PRESSURE: 71 MMHG | WEIGHT: 180 LBS | HEART RATE: 90 BPM

## 2024-10-22 DIAGNOSIS — Z94.4 LIVER TRANSPLANT RECIPIENT (HCC): Primary | ICD-10-CM

## 2024-10-22 PROCEDURE — 99214 OFFICE O/P EST MOD 30 MIN: CPT | Performed by: INTERNAL MEDICINE

## 2024-10-22 PROCEDURE — 3077F SYST BP >= 140 MM HG: CPT | Performed by: INTERNAL MEDICINE

## 2024-10-22 PROCEDURE — 3078F DIAST BP <80 MM HG: CPT | Performed by: INTERNAL MEDICINE

## 2024-10-22 ASSESSMENT — PATIENT HEALTH QUESTIONNAIRE - PHQ9
SUM OF ALL RESPONSES TO PHQ QUESTIONS 1-9: 0
SUM OF ALL RESPONSES TO PHQ9 QUESTIONS 1 & 2: 0
SUM OF ALL RESPONSES TO PHQ QUESTIONS 1-9: 0
SUM OF ALL RESPONSES TO PHQ QUESTIONS 1-9: 0
2. FEELING DOWN, DEPRESSED OR HOPELESS: NOT AT ALL
SUM OF ALL RESPONSES TO PHQ QUESTIONS 1-9: 0
DEPRESSION UNABLE TO ASSESS: FUNCTIONAL CAPACITY MOTIVATION LIMITS ACCURACY
1. LITTLE INTEREST OR PLEASURE IN DOING THINGS: NOT AT ALL

## 2024-10-22 ASSESSMENT — ANXIETY QUESTIONNAIRES
7. FEELING AFRAID AS IF SOMETHING AWFUL MIGHT HAPPEN: NOT AT ALL
3. WORRYING TOO MUCH ABOUT DIFFERENT THINGS: NOT AT ALL
6. BECOMING EASILY ANNOYED OR IRRITABLE: NOT AT ALL
GAD7 TOTAL SCORE: 0
1. FEELING NERVOUS, ANXIOUS, OR ON EDGE: NOT AT ALL
4. TROUBLE RELAXING: NOT AT ALL
IF YOU CHECKED OFF ANY PROBLEMS ON THIS QUESTIONNAIRE, HOW DIFFICULT HAVE THESE PROBLEMS MADE IT FOR YOU TO DO YOUR WORK, TAKE CARE OF THINGS AT HOME, OR GET ALONG WITH OTHER PEOPLE: NOT DIFFICULT AT ALL
5. BEING SO RESTLESS THAT IT IS HARD TO SIT STILL: NOT AT ALL
2. NOT BEING ABLE TO STOP OR CONTROL WORRYING: NOT AT ALL

## 2024-10-22 NOTE — PROGRESS NOTES
Chief Complaint   Patient presents with    Follow-up     N/A     Vitals:    10/22/24 1132   BP: (!) 156/71   Site: Left Upper Arm   Position: Sitting   Pulse: 90   Temp: 100 °F (37.8 °C)   TempSrc: Temporal   SpO2: 97%   Weight: 81.6 kg (180 lb)   Height: 1.702 m (5' 7\")     .  \"Have you been to the ER, urgent care clinic since your last visit?  Hospitalized since your last visit?\"    NO    “Have you seen or consulted any other health care providers outside of [facility] since your last visit?”    NO        “Have you had a colorectal cancer screening such as a colonoscopy/FIT/Cologuard?    YES - Type: Colonoscopy - Where: 2 months ago   Nurse/CMA to request most recent records if not in the chart     No colonoscopy on file  No cologuard on file  No FIT/FOBT on file   No flexible sigmoidoscopy on file         Click Here for Release of Records Request

## 2024-10-23 RX ORDER — DUTASTERIDE 0.5 MG/1
0.5 CAPSULE, LIQUID FILLED ORAL DAILY
COMMUNITY

## 2024-10-23 RX ORDER — NEBIVOLOL 5 MG/1
5 TABLET ORAL DAILY
COMMUNITY

## 2024-10-23 RX ORDER — ZINC GLUCONATE 50 MG
50 TABLET ORAL EVERY EVENING
COMMUNITY

## 2024-10-23 RX ORDER — THYROID 60 MG/1
60 TABLET ORAL DAILY
COMMUNITY

## 2024-11-12 NOTE — PROGRESS NOTES
[facility]     Dakotah Monaco MD, FACP, MACG, FAASLD   MD Cassy Torres, SALAS Guerra, [facility]   Joan Gray, [facility]   Cristina Elliottosta, [facility]-  Bakari Mckeon, [facility]   Kasie Mcpherson, [facility]   Sarah Dawson, [facility]   [address], [address]   [place]  [address] [phone]   FAX: [phone]  [facility]   [address], [address]   [place]  [address] [phone]   FAX: [phone]       Patient Care Team:  Valentina Kelly RN as Care Coordinator (Hepatology)  Ovi French      Patient Active Problem List   Diagnosis    Renal insufficiency    Hypertension    Complication of transplanted liver (HCC)    S/P knee replacement    Long-term use of immunosuppressant medication    Tremor    Guillain-[place] syndrome (HCC)    Pulmonary embolism (HCC)    H/O liver transplant (HCC)    Edema    H/O cervical spine surgery    Type II diabetes mellitus (HCC)    Neuropathy       Yuridia Russell returns to the Liver [facility] for his annual visit to assess liver allograft function and to manage side effects of immune suppression.      The active problem list, all pertinent past medical history, medications, endoscopic studies, radiologic findings and laboratory findings related to the liver disorder were reviewed with the patient.  We continue to monitor his labs and immune suppression between office visits.    The patient underwent liver transplantation at [place], [place] in 11/2010.      The patient was switched to sirolimus based immune suppression because of chronic renal insufficiency.      There has been no episodes of acute rejection.  There has been no evidence of chronic rejection.    In the office today the patient has

## 2024-12-12 ENCOUNTER — CLINICAL DOCUMENTATION (OUTPATIENT)
Age: 64
End: 2024-12-12

## 2024-12-12 NOTE — PROGRESS NOTES
Spoke to pt regarding his continued pain. Pt is declining offer of video or in-office visit to discuss medication options as they have not worked for him in the past. He is requesting a referral to Dr. Mari to discuss surgical options.    Updated patient written language to English